# Patient Record
Sex: MALE | Race: WHITE
[De-identification: names, ages, dates, MRNs, and addresses within clinical notes are randomized per-mention and may not be internally consistent; named-entity substitution may affect disease eponyms.]

---

## 2017-12-04 ENCOUNTER — HOSPITAL ENCOUNTER (EMERGENCY)
Dept: HOSPITAL 74 - JER | Age: 77
Discharge: HOME | End: 2017-12-04
Payer: COMMERCIAL

## 2017-12-04 VITALS — SYSTOLIC BLOOD PRESSURE: 110 MMHG | HEART RATE: 82 BPM | DIASTOLIC BLOOD PRESSURE: 75 MMHG

## 2017-12-04 VITALS — BODY MASS INDEX: 27.7 KG/M2

## 2017-12-04 VITALS — TEMPERATURE: 98.4 F

## 2017-12-04 DIAGNOSIS — I10: ICD-10-CM

## 2017-12-04 DIAGNOSIS — Z98.890: ICD-10-CM

## 2017-12-04 DIAGNOSIS — N40.1: ICD-10-CM

## 2017-12-04 DIAGNOSIS — E78.00: ICD-10-CM

## 2017-12-04 DIAGNOSIS — Z86.69: ICD-10-CM

## 2017-12-04 DIAGNOSIS — R33.8: ICD-10-CM

## 2017-12-04 DIAGNOSIS — N30.00: Primary | ICD-10-CM

## 2017-12-04 LAB
ALBUMIN SERPL-MCNC: 3.3 G/DL (ref 3.4–5)
ALP SERPL-CCNC: 82 U/L (ref 45–117)
ALT SERPL-CCNC: 47 U/L (ref 12–78)
ANION GAP SERPL CALC-SCNC: 6 MMOL/L (ref 8–16)
APPEARANCE UR: (no result)
AST SERPL-CCNC: 43 U/L (ref 15–37)
BACTERIA #/AREA URNS HPF: (no result) /HPF
BASOPHILS # BLD: 0.8 % (ref 0–2)
BILIRUB SERPL-MCNC: 1.1 MG/DL (ref 0.2–1)
BILIRUB UR STRIP.AUTO-MCNC: NEGATIVE MG/DL
CALCIUM SERPL-MCNC: 8.7 MG/DL (ref 8.5–10.1)
CO2 SERPL-SCNC: 30 MMOL/L (ref 21–32)
COLOR UR: (no result)
CREAT SERPL-MCNC: 1 MG/DL (ref 0.7–1.3)
DEPRECATED RDW RBC AUTO: 14.1 % (ref 11.9–15.9)
EOSINOPHIL # BLD: 3.3 % (ref 0–4.5)
GLUCOSE SERPL-MCNC: 100 MG/DL (ref 74–106)
INR BLD: 1.32 (ref 0.82–1.09)
KETONES UR QL STRIP: NEGATIVE
LEUKOCYTE ESTERASE UR QL STRIP.AUTO: (no result)
MCH RBC QN AUTO: 29 PG (ref 25.7–33.7)
MCHC RBC AUTO-ENTMCNC: 33.3 G/DL (ref 32–35.9)
MCV RBC: 87.2 FL (ref 80–96)
MUCOUS THREADS URNS QL MICRO: (no result)
NEUTROPHILS # BLD: 76.2 % (ref 42.8–82.8)
NITRITE UR QL STRIP: NEGATIVE
PH UR: 5 [PH] (ref 5–8)
PLATELET # BLD AUTO: 151 K/MM3 (ref 134–434)
PMV BLD: 9.3 FL (ref 7.5–11.1)
PROT SERPL-MCNC: 6.4 G/DL (ref 6.4–8.2)
PROT UR QL STRIP: (no result)
PROT UR QL STRIP: NEGATIVE
PT PNL PPP: 14.9 SEC (ref 9.98–11.88)
RBC # UR STRIP: (no result) /UL
RBC #/AREA URNS HPF: 141 /HPF (ref 0–3)
SP GR UR: 1.02 (ref 1–1.03)
UROBILINOGEN UR STRIP-MCNC: (no result) MG/DL (ref 0.2–1)
WBC # BLD AUTO: 13.4 K/MM3 (ref 4–10)
WBC #/AREA URNS HPF: 22 /HPF (ref 3–5)

## 2017-12-04 PROCEDURE — 3E0337Z INTRODUCTION OF ELECTROLYTIC AND WATER BALANCE SUBSTANCE INTO PERIPHERAL VEIN, PERCUTANEOUS APPROACH: ICD-10-PCS

## 2017-12-04 PROCEDURE — 0T9B70Z DRAINAGE OF BLADDER WITH DRAINAGE DEVICE, VIA NATURAL OR ARTIFICIAL OPENING: ICD-10-PCS

## 2017-12-04 NOTE — PDOC
History of Present Illness





- History of Present Illness


Initial Comments: 





12/04/17 11:22


The patient is a 77 year old male, with a significant past medical history of 

seizures ( last one 15 years ago) BPH, HTN, hypercholesterolemia,  macular 

degeneration, who presents to the emergency department for dysuria since Friday 

night. Patient states that he has a cystoscopy last tuesday (11/28) due to 

blood in urine. He has had past cystoscopies. Initially after the procedure, he 

was able to urinate but it was painful On Friday night around 8:30 pm he began 

experiencing flu-like symptoms including fever, chills, and subjective fever. 

His flu-like symptoms persisted till this Sunday. He also reports that on 

Friday night he was no longer urinating normally. He felt a sense of urgency to 

urinate but was only able to go in small, dribble-like amounts and it was still 

very painful for him to go. He states that he took 2 extra strength Tylenol 

every 2 hrs for the pain. Before the procedure he had blood in his urine but 

after the procedure he denies hematuria. He says that he has no appetite but 

denies recent nausea or vomiting. He denies any back pain. He denies chest pain

, shortness of breath. He denies any diarrhea or constipation.  








Past surgical history:Cystoscopy. Mohs surgery on nose (11/30/17)


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 Denies EtOH use and 

recreational drug use. 


FH: lung cancer


Allergies: NKDA


Urologist: James Dueñas 








<Ellie Scott - Last Filed: 12/04/17 11:24>





<Srini Clarke - Last Filed: 12/04/17 13:53>





- General


Chief Complaint: Urinary Problem


Stated Complaint: URINARY PROBLEM (PCP SENT)


Time Seen by Provider: 12/04/17 10:33





Past History





<Ellie Scott - Last Filed: 12/04/17 11:24>





<Srini Clarke - Last Filed: 12/04/17 13:53>





- Past Medical History


Allergies/Adverse Reactions: 


 Allergies











Allergy/AdvReac Type Severity Reaction Status Date / Time


 


No Known Allergies Allergy   Verified 12/04/17 11:22











Home Medications: 


Ambulatory Orders





Levofloxacin [Levaquin] 500 mg PO DAILY #7 tablet 12/04/17 











**Review of Systems





- Review of Systems


Constitutional: Yes: Chills, Fever (resolved yesterday)


Respiratory: No: Cough, Shortness of Breath


Cardiac (ROS): No: Chest Pain, Syncope


ABD/GI: No: Diarrhea, Vomiting


: Yes: See HPI


All Other Systems: Reviewed and Negative





<Srini Clarke - Last Filed: 12/04/17 13:53>





*Physical Exam





- Vital Signs


 Last Vital Signs











Temp Pulse Resp BP Pulse Ox


 


 97.6 F   92 H  16   102/71   96 


 


 12/04/17 10:20  12/04/17 10:20  12/04/17 10:20  12/04/17 10:20  12/04/17 10:20














- Physical Exam


Comments: 





12/04/17 11:22


GENERAL: The patient is awake, alert, and fully oriented, in no acute distress.


HEAD: Normal with no signs of trauma.


EYES: Pupils equal, round and reactive to light, extraocular movements intact, 

sclera anicteric, conjunctiva clear with no pallor.


ENT: Ears normal, nares patent, oropharynx clear without exudates.  Moist 

mucous membranes.


NECK: Normal range of motion, supple without lymphadenopathy, JVD, or masses.


LUNGS: Breath sounds equal, clear to auscultation bilaterally.  No wheeze/

crackles.


HEART: Regular rate and rhythm, normal S1 and S2 without murmur or rub.


ABDOMEN: Distended bladder between the symphysis and umbilicus. Some discomfort 

to palpation. BS wnl.  No guarding or rebound.  No palpable masses. No 

hepatosplenomegaly. 


BACK: No CVA tenderness. 


EXTREMITIES: Normal range of motion, no edema.  No clubbing or cyanosis. No 

cords, erythema, or tenderness.


NEUROLOGICAL: Cranial nerves II through XII grossly intact.  Normal speech, 

normal gait.


PSYCH: Normal mood, normal affect.


SKIN: Clean incision along the nose s/p/ Moh's surgery. Warm, Dry, normal turgor

, no rashes or lesions noted.








<Ellie Scott - Last Filed: 12/04/17 11:24>





ED Treatment Course





- LABORATORY


CBC & Chemistry Diagram: 


 12/04/17 11:40





 12/04/17 11:40





<Srini Clarke - Last Filed: 12/04/17 13:53>





Medical Decision Making





- Medical Decision Making





12/04/17 11:18


A portion of this note was documented by scribe services under my direction. I 

have reviewed the details of the note, within reason, and agree with the 

documentation with the following case summary and management plan written by me.





77-year-old male with history of recurring hematuria and several cystoscopies 

in the past, last was 6 days ago started empirically on Macrobid after the 

procedure. 2 days after the procedure started having decreased urine output 

with dribbling and urgency, over the last 3 or 4 days has had subjective fevers 

and chills, referred to the emergency department today from Dr. Dueñas's 

office. Today actually feels much better from the perspective of fever/chills, 

still has pelvic fullness and difficulty urinating. No gross hematuria. Still 

taking Macrobid.





afebrile, VSS


well appearing standing at bedside, ambulating and conversant. 


s1s2 normal rate


abd soft/nd/nt. no cvat, + bladder fullness midway between umbilicus at pubis, 

meatus normal without discharge





76y/o M with urinary retention possible 2/2 cystitis/UTI. fever/chills over the 

weekend but feels well now and afebrile with normal VS. 


labs, ua/urine cx


dotson placement for retention


ivf, iv abx


discuss dispo with Dr. Dueñas





12/04/17 13:42


wbc 13.4, UA with blood and 22 wbc but no nitrites. Dotson produced only 50cc of 

clear urine as post-void, clear urine output since then. 


Discussed with Dr. Dueñas, who knows the patient very well. Likely 

inflammatory prostatic reaction, agrees with d/c dotson and discharge on 

different abx (levaquin) and pt can f/u in the office within 48 hours. 





<Srini Clarke - Last Filed: 12/04/17 13:53>





*DC/Admit/Observation/Transfer





- Attestations


Scribe Attestion: 





12/04/17 11:23





Documentation prepared by Ellie Scott, acting as medical scribe for 

Srini Clarke MD.





<Ellie Scott - Last Filed: 12/04/17 11:24>





<Srini Clarke - Last Filed: 12/04/17 13:53>


Diagnosis at time of Disposition: 


 Urinary retention, Cystitis








- Discharge Dispostion


Disposition: HOME


Condition at time of disposition: Improved





- Prescriptions


Prescriptions: 


Levofloxacin [Levaquin] 500 mg PO DAILY #7 tablet





- Referrals


Referrals: 


James Dueñas MD [Staff Physician] - 





- Patient Instructions


Printed Discharge Instructions:  DI for Acute Cystitis


Additional Instructions: 


Activity as tolerated. Stay hydrated. 





Blood and urine tests showed a slight infection/inflammation in the bladder. 


We spoke with Dr. Dueñas: The plan is to Stop Macrobid and start Levaquin and 

to follow up in his office within 1-2 days. 





Continue your medications as previously prescribed by your physician. []





You should follow up with Dr. Dueñas as soon as possible regarding today's 

emergency department visit.





Return to the emergency department for any new or concerning symptoms, 

particularly inability to urinate, fever/chills, intolerable pain or flank 

pain.

## 2023-08-27 ENCOUNTER — HOSPITAL ENCOUNTER (EMERGENCY)
Dept: HOSPITAL 74 - JER | Age: 83
Discharge: HOME | End: 2023-08-27
Payer: COMMERCIAL

## 2023-08-27 VITALS — BODY MASS INDEX: 28.5 KG/M2

## 2023-08-27 VITALS — HEART RATE: 81 BPM | DIASTOLIC BLOOD PRESSURE: 88 MMHG | RESPIRATION RATE: 19 BRPM | SYSTOLIC BLOOD PRESSURE: 158 MMHG

## 2023-08-27 VITALS — TEMPERATURE: 97.6 F

## 2023-08-27 DIAGNOSIS — R10.32: ICD-10-CM

## 2023-08-27 DIAGNOSIS — R10.12: Primary | ICD-10-CM

## 2023-08-27 DIAGNOSIS — R11.10: ICD-10-CM

## 2023-08-27 DIAGNOSIS — N20.0: ICD-10-CM

## 2023-08-27 LAB
ALBUMIN SERPL-MCNC: 3.6 G/DL (ref 3.4–5)
ALP SERPL-CCNC: 52 U/L (ref 45–117)
ALT SERPL-CCNC: 25 U/L (ref 13–61)
ANION GAP SERPL CALC-SCNC: 7 MMOL/L (ref 8–16)
APPEARANCE UR: (no result)
AST SERPL-CCNC: 22 U/L (ref 15–37)
BACTERIA # UR AUTO: 7 /UL (ref 0–1359)
BASOPHILS # BLD: 0.6 % (ref 0–2)
BILIRUB SERPL-MCNC: 0.6 MG/DL (ref 0.2–1)
BILIRUB UR STRIP.AUTO-MCNC: NEGATIVE MG/DL
BUN SERPL-MCNC: 22.9 MG/DL (ref 7–18)
CALCIUM SERPL-MCNC: 8.8 MG/DL (ref 8.5–10.1)
CASTS URNS QL MICRO: 2 /UL (ref 0–3.1)
CHLORIDE SERPL-SCNC: 110 MMOL/L (ref 98–107)
CO2 SERPL-SCNC: 27 MMOL/L (ref 21–32)
COLOR UR: YELLOW
CREAT SERPL-MCNC: 1 MG/DL (ref 0.55–1.3)
DEPRECATED RDW RBC AUTO: 15.4 % (ref 11.9–15.9)
EOSINOPHIL # BLD: 1.4 % (ref 0–4.5)
EPITH CASTS URNS QL MICRO: 23 /UL (ref 0–25.1)
GLUCOSE SERPL-MCNC: 160 MG/DL (ref 74–106)
HCT VFR BLD CALC: 46 % (ref 35.4–49)
HGB BLD-MCNC: 16 GM/DL (ref 11.7–16.9)
KETONES UR QL STRIP: NEGATIVE
LEUKOCYTE ESTERASE UR QL STRIP.AUTO: (no result)
LIPASE SERPL-CCNC: 118 U/L (ref 73–393)
LYMPHOCYTES # BLD: 8.2 % (ref 8–40)
MCH RBC QN AUTO: 30.5 PG (ref 25.7–33.7)
MCHC RBC AUTO-ENTMCNC: 34.7 G/DL (ref 32–35.9)
MCV RBC: 87.8 FL (ref 80–96)
MONOCYTES # BLD AUTO: 9.3 % (ref 3.8–10.2)
NEUTROPHILS # BLD: 80.5 % (ref 42.8–82.8)
NITRITE UR QL STRIP: NEGATIVE
PH UR: 6 [PH] (ref 5–8)
PLATELET # BLD AUTO: 171 10^3/UL (ref 134–434)
PMV BLD: 8.4 FL (ref 7.5–11.1)
POTASSIUM SERPLBLD-SCNC: 3.5 MMOL/L (ref 3.5–5.1)
PROT SERPL-MCNC: 6.7 G/DL (ref 6.4–8.2)
PROT UR QL STRIP: (no result)
PROT UR QL STRIP: NEGATIVE
RBC # BLD AUTO: 5.24 M/MM3 (ref 4–5.6)
RBC # BLD AUTO: 6177 /UL (ref 0–23.9)
SODIUM SERPL-SCNC: 143 MMOL/L (ref 136–145)
SP GR UR: 1.04 (ref 1.01–1.03)
UROBILINOGEN UR STRIP-MCNC: 1 MG/DL (ref 0.2–1)
WBC # BLD AUTO: 10.3 K/MM3 (ref 4–10)
WBC # UR AUTO: 255 /UL (ref 0–25.8)

## 2023-08-27 PROCEDURE — 3E033GC INTRODUCTION OF OTHER THERAPEUTIC SUBSTANCE INTO PERIPHERAL VEIN, PERCUTANEOUS APPROACH: ICD-10-PCS

## 2023-08-27 PROCEDURE — 3E033NZ INTRODUCTION OF ANALGESICS, HYPNOTICS, SEDATIVES INTO PERIPHERAL VEIN, PERCUTANEOUS APPROACH: ICD-10-PCS
